# Patient Record
Sex: FEMALE | Race: AMERICAN INDIAN OR ALASKA NATIVE
[De-identification: names, ages, dates, MRNs, and addresses within clinical notes are randomized per-mention and may not be internally consistent; named-entity substitution may affect disease eponyms.]

---

## 2019-01-22 ENCOUNTER — HOSPITAL ENCOUNTER (INPATIENT)
Dept: HOSPITAL 5 - ED | Age: 39
Discharge: HOME | DRG: 313 | End: 2019-01-22
Attending: INTERNAL MEDICINE | Admitting: INTERNAL MEDICINE
Payer: SELF-PAY

## 2019-01-22 VITALS — SYSTOLIC BLOOD PRESSURE: 118 MMHG | DIASTOLIC BLOOD PRESSURE: 56 MMHG

## 2019-01-22 DIAGNOSIS — R03.0: ICD-10-CM

## 2019-01-22 DIAGNOSIS — E87.0: ICD-10-CM

## 2019-01-22 DIAGNOSIS — Z72.89: ICD-10-CM

## 2019-01-22 DIAGNOSIS — Z82.49: ICD-10-CM

## 2019-01-22 DIAGNOSIS — R07.9: Primary | ICD-10-CM

## 2019-01-22 LAB
BASOPHILS # (AUTO): 0.1 K/MM3 (ref 0–0.1)
BASOPHILS NFR BLD AUTO: 1.2 % (ref 0–1.8)
BUN SERPL-MCNC: 6 MG/DL (ref 7–17)
BUN/CREAT SERPL: 8 %
CALCIUM SERPL-MCNC: 9.6 MG/DL (ref 8.4–10.2)
EOSINOPHIL # BLD AUTO: 0.1 K/MM3 (ref 0–0.4)
EOSINOPHIL NFR BLD AUTO: 0.9 % (ref 0–4.3)
HCT VFR BLD CALC: 39.6 % (ref 30.3–42.9)
HEMOLYSIS INDEX: 15
HGB BLD-MCNC: 13.7 GM/DL (ref 10.1–14.3)
LYMPHOCYTES # BLD AUTO: 2.9 K/MM3 (ref 1.2–5.4)
LYMPHOCYTES NFR BLD AUTO: 31.6 % (ref 13.4–35)
MCHC RBC AUTO-ENTMCNC: 35 % (ref 30–34)
MCV RBC AUTO: 91 FL (ref 79–97)
MONOCYTES # (AUTO): 0.5 K/MM3 (ref 0–0.8)
MONOCYTES % (AUTO): 5.2 % (ref 0–7.3)
PLATELET # BLD: 310 K/MM3 (ref 140–440)
RBC # BLD AUTO: 4.36 M/MM3 (ref 3.65–5.03)

## 2019-01-22 PROCEDURE — 80048 BASIC METABOLIC PNL TOTAL CA: CPT

## 2019-01-22 PROCEDURE — 82550 ASSAY OF CK (CPK): CPT

## 2019-01-22 PROCEDURE — 96374 THER/PROPH/DIAG INJ IV PUSH: CPT

## 2019-01-22 PROCEDURE — 93017 CV STRESS TEST TRACING ONLY: CPT

## 2019-01-22 PROCEDURE — 90686 IIV4 VACC NO PRSV 0.5 ML IM: CPT

## 2019-01-22 PROCEDURE — 36415 COLL VENOUS BLD VENIPUNCTURE: CPT

## 2019-01-22 PROCEDURE — 84484 ASSAY OF TROPONIN QUANT: CPT

## 2019-01-22 PROCEDURE — 71045 X-RAY EXAM CHEST 1 VIEW: CPT

## 2019-01-22 PROCEDURE — 93010 ELECTROCARDIOGRAM REPORT: CPT

## 2019-01-22 PROCEDURE — 93005 ELECTROCARDIOGRAM TRACING: CPT

## 2019-01-22 PROCEDURE — 82553 CREATINE MB FRACTION: CPT

## 2019-01-22 PROCEDURE — 85025 COMPLETE CBC W/AUTO DIFF WBC: CPT

## 2019-01-22 PROCEDURE — 96375 TX/PRO/DX INJ NEW DRUG ADDON: CPT

## 2019-01-22 NOTE — HISTORY AND PHYSICAL REPORT
History of Present Illness


Date of examination: 01/22/19


History of present illness: 


38-year-old woman with no medical problems comes emergency room with complaints 

of chest pain that started while she was at work.  Pain is in the left 

substernal area which she describes as sharp, someone sitting on her chest, 

constant, intensity 7/10, radiating to the left arm, relieved with 

nitroglycerin.  Admits to nausea, shortness of breath, palpitation and feeling 

clammy.  She seen a physician in November and her blood pressure was elevated at

that time, she is not on any antihypertensive


Review of systems


Constitutional: no  weight loss, chills, fever


Ears, eyes, nose, mouth and throat: no nasal congestion, no nasal discharge, no 

sinus pressure, no vision change, no red eye.


Neck: No neck pain or rigidity.


Cardiovascular: +palpitations


Respiratory: no cough, +shortness of breath


Gastrointestinal: no hematochezia, abdominal pain


Genitourinary : no  frequency , no hematuria


Musculoskeletal: no joint swelling or muscle ache 


Integumentary: no rash, no pruritis


Neurological: no parathesias, no focal weakness


Endocrine: no cold or heat intolerance, no polyuria or polydipsia


Hematologic/Lymphatic: no easy bruising, no easy bleeding, no gland swelling


Allergic/Immunologic: no urticaria, no angioedema.





PAST MEDICAL HISTORY: None





PAST SURGICAL HISTORY: None





SOCIAL HISTORY: Denies alcohol,  drugs, tobacco





FAMILY HISTORY: Hypertension








Medications and Allergies


                                    Allergies











Allergy/AdvReac Type Severity Reaction Status Date / Time


 


No Known Allergies Allergy   Verified 01/22/19 03:28














Exam





- Physical Exam


Narrative exam: 


General Apperance: The patient lying in bed,  breathing comfortable 


HEENT: Normocephalic, atraumatic.  Pupils equally round and reactive to light, 

EOMI, no sclericterus or JVD or thyromegaly or nodule.  , no carotid bruit, 

mucous membranes moist, no exudate or erythema


Heart: S1-S2, regular is rhythm


Lungs: Clear to auscultation bilaterally, breathing comfortable


Abdomen: Positive bowel sounds, soft, nontender, nondistended, no organomegaly


Extremities: No edema cyanosis clubbing


Skin: no rash, nodule, warm and dry


Neuro: cranial nerves 2-12 intact, speech is fluent, motor/sensory intact





- Constitutional


Vitals: 


                                        











Temp Pulse Resp BP Pulse Ox


 


 98.4 F   64   13   144/80   98 


 


 01/22/19 03:20  01/22/19 05:15  01/22/19 05:15  01/22/19 05:15  01/22/19 03:20














Results





- Labs


CBC & Chem 7: 


                                 01/22/19 03:30





                                 01/22/19 03:30


Labs: 


                              Abnormal lab results











  01/22/19 01/22/19 Range/Units





  03:30 03:30 


 


MCHC  35 H   (30-34)  %


 


RDW  12.9 L   (13.2-15.2)  %


 


Sodium   146 H  (137-145)  mmol/L


 


Chloride   107.3 H  ()  mmol/L


 


BUN   6 L  (7-17)  mg/dL


 


Glucose   104 H  ()  mg/dL














- Imaging and Cardiology


EKG: image reviewed


Chest x-ray: report reviewed





Assessment and Plan


Assessment


Chest pain, rule out ACS


Elevated blood pressure


Hypernatremia





Plan


Admit to medicine


Check cardiac enzymes, obtain a stress test


IV hydralazine and the blood pressure control


Start aspirin, IV morphine, IV fluid


DVT prophylaxis

## 2019-01-22 NOTE — EMERGENCY DEPARTMENT REPORT
HPI





- General


Chief Complaint: Chest Pain


Time Seen by Provider: 01/22/19 04:51





- HPI


HPI: 





Room 2





The patient is a 38-year-old female presenting with a chief complaint of chest 

pain.  The patient states she was at work standing at 22:30 was developed sub

sternal chest heaviness radiating to the left upper extremity.  Patient states 

she developed shortness of breath and nausea without vomiting.  She states she 

began to feel clammy.  Patient states the pain has been intermittent.  The 

patient states she was administered nitroglycerin by EMS and her chest pain 

improved.  The patient states she's never had a stress test or cardiac 

catheterization





Location: Chest, see above


Duration: Intermittent since 22:30


Quality: Heaviness


Severity: Moderate


Modifying factors: [see above]


Context: [see above]


Mode of transportation: [not driving]





ED Past Medical Hx





- Past Medical History


Previous Medical History?: No





- Surgical History


Past Surgical History?: No





- Family History


Family history: no significant





- Social History


Smoking Status: Never Smoker


Substance Use Type: None (denies illicit drug use), Alcohol (occasional)





ED Review of Systems


ROS: 


Stated complaint: CHEST PAIN


Other details as noted in HPI





Constitutional: diaphoresis


Eyes: denies: eye pain


ENT: denies: throat pain


Respiratory: shortness of breath


Cardiovascular: chest pain


Endocrine: no symptoms reported


Gastrointestinal: nausea.  denies: vomiting


Genitourinary: denies: dysuria


Musculoskeletal: denies: back pain


Neurological: headache (after nitroglycerin)





Physical Exam





- Physical Exam


Vital Signs: 


                                   Vital Signs











  01/22/19





  03:20


 


Temperature 98.4 F


 


Pulse Rate 74


 


Respiratory 18





Rate 


 


Blood Pressure 136/76


 


O2 Sat by Pulse 98





Oximetry 











Physical Exam: 





GENERAL: The patient is well-developed well-nourished female sitting on 

stretcher not appearing to be in acute distress. []


HEENT: Normocephalic.  Atraumatic.  Extraocular motions are intact.  Patient has

 moist mucous membranes.


NECK: Supple.  Trachea midline


CHEST/LUNGS: Clear to auscultation.  There is no respiratory distress noted.


HEART/CARDIOVASCULAR: Regular.  There is no tachycardia.  There is no gallop rub

 or murmur.


ABDOMEN: Abdomen is soft, nontender.  Patient has normal bowel sounds.  There is

 no abdominal distention.


SKIN: There is no rash.  There is no edema.  There is no diaphoresis.


NEURO: The patient is awake, alert, and oriented.  The patient is cooperative.  

 The patient has normal speech


MUSCULOSKELETAL:  There is no evidence of acute injury.





ED Course


                                   Vital Signs











  01/22/19





  03:20


 


Temperature 98.4 F


 


Pulse Rate 74


 


Respiratory 18





Rate 


 


Blood Pressure 136/76


 


O2 Sat by Pulse 98





Oximetry 














ED Medical Decision Making





- Lab Data


Result diagrams: 


                                 01/22/19 03:30





                                 01/22/19 03:30





                                Laboratory Tests











  01/22/19 01/22/19





  03:30 03:30


 


WBC  9.2 


 


RBC  4.36 


 


Hgb  13.7 


 


Hct  39.6 


 


MCV  91 


 


MCH  31 


 


MCHC  35 H 


 


RDW  12.9 L 


 


Plt Count  310 


 


Lymph % (Auto)  31.6 


 


Mono % (Auto)  5.2 


 


Eos % (Auto)  0.9 


 


Baso % (Auto)  1.2 


 


Lymph #  2.9 


 


Mono #  0.5 


 


Eos #  0.1 


 


Baso #  0.1 


 


Seg Neutrophils %  61.1 


 


Seg Neutrophils #  5.6 


 


Sodium   146 H


 


Potassium   4.0


 


Chloride   107.3 H


 


Carbon Dioxide   23


 


Anion Gap   20


 


BUN   6 L


 


Creatinine   0.8


 


Estimated GFR   > 60


 


BUN/Creatinine Ratio   8


 


Glucose   104 H


 


Calcium   9.6


 


Troponin T   < 0.010














- EKG Data


-: EKG Interpreted by Me


EKG shows normal: sinus rhythm


Rate: normal





- EKG Data


When compared to previous EKG there are: previous EKG unavailable


Interpretation: other (no ischemic changes seen)





- Radiology Data


Radiology results: image reviewed (chest x-ray)


interpreted by me: 





Chest x-ray-no focal infiltrate, no pneumothorax





- Differential Diagnosis


ACS, pericarditis, GERD


Critical care attestation.: 


If time is entered above; I have spent that time in minutes in the direct care 

of this critically ill patient, excluding procedure time.








ED Disposition


Clinical Impression: 


 Chest pain





Disposition: DC-01 TO HOME OR SELFCARE


Is pt being admited?: Yes


Does the pt Need Aspirin: Yes


Condition: Fair


Instructions:  Chest Pain (ED)


Time of Disposition: 05:11 (hospitalist paged (Dr. Phyllis Schultz))

## 2019-01-22 NOTE — XRAY REPORT
FINAL REPORT



PROCEDURE:  XR CHEST 1V AP



TECHNIQUE:  Chest radiograph anteroposterior view. CPT 69271







HISTORY:  chest pain 



COMPARISON:  No prior studies are available for comparison.



FINDINGS:  

Heart: Normal.



Mediastinum/Vessels: Normal.



Lungs/Pleural space: Normal.



Bony thorax: No acute osseous abnormality.



Life support devices: None.



IMPRESSION:  

No acute cardiopulmonary abnormality.